# Patient Record
Sex: MALE | Race: BLACK OR AFRICAN AMERICAN
[De-identification: names, ages, dates, MRNs, and addresses within clinical notes are randomized per-mention and may not be internally consistent; named-entity substitution may affect disease eponyms.]

---

## 2020-06-01 ENCOUNTER — HOSPITAL ENCOUNTER (EMERGENCY)
Dept: HOSPITAL 26 - MED | Age: 35
Discharge: HOME | End: 2020-06-01
Payer: SELF-PAY

## 2020-06-01 VITALS — HEIGHT: 68 IN | BODY MASS INDEX: 24.25 KG/M2 | WEIGHT: 160 LBS

## 2020-06-01 VITALS — SYSTOLIC BLOOD PRESSURE: 113 MMHG | DIASTOLIC BLOOD PRESSURE: 81 MMHG

## 2020-06-01 VITALS — SYSTOLIC BLOOD PRESSURE: 119 MMHG | DIASTOLIC BLOOD PRESSURE: 66 MMHG

## 2020-06-01 DIAGNOSIS — F15.20: Primary | ICD-10-CM

## 2020-06-01 DIAGNOSIS — R45.851: ICD-10-CM

## 2020-06-01 LAB
ALBUMIN FLD-MCNC: 3.7 G/DL (ref 3.4–5)
ANION GAP SERPL CALCULATED.3IONS-SCNC: 14.2 MMOL/L (ref 8–16)
APAP SERPL-MCNC: < 0.5 UG/ML (ref 10–30)
AST SERPL-CCNC: 33 U/L (ref 15–37)
BARBITURATES UR QL SCN: NEGATIVE NG/ML
BASOPHILS # BLD AUTO: 0 K/UL (ref 0–0.22)
BASOPHILS NFR BLD AUTO: 0.2 % (ref 0–2)
BENZODIAZ UR QL SCN: NEGATIVE NG/ML
BILIRUB SERPL-MCNC: 1.3 MG/DL (ref 0–1)
BUN SERPL-MCNC: 14 MG/DL (ref 7–18)
BZE UR QL SCN: NEGATIVE NG/ML
CANNABINOIDS UR QL SCN: POSITIVE NG/ML
CHLORIDE SERPL-SCNC: 104 MMOL/L (ref 98–107)
CO2 SERPL-SCNC: 27.2 MMOL/L (ref 21–32)
CREAT SERPL-MCNC: 1 MG/DL (ref 0.6–1.3)
EOSINOPHIL # BLD AUTO: 0.2 K/UL (ref 0–0.4)
EOSINOPHIL NFR BLD AUTO: 5.2 % (ref 0–4)
ERYTHROCYTE [DISTWIDTH] IN BLOOD BY AUTOMATED COUNT: 14.4 % (ref 11.6–13.7)
GFR SERPL CREATININE-BSD FRML MDRD: 109 ML/MIN (ref 90–?)
GLUCOSE SERPL-MCNC: 98 MG/DL (ref 74–106)
HCT VFR BLD AUTO: 46.1 % (ref 36–52)
HGB BLD-MCNC: 15 G/DL (ref 12–18)
LYMPHOCYTES # BLD AUTO: 1.6 K/UL (ref 2–11.5)
LYMPHOCYTES NFR BLD AUTO: 38.4 % (ref 20.5–51.1)
MCH RBC QN AUTO: 28 PG (ref 27–31)
MCHC RBC AUTO-ENTMCNC: 32 G/DL (ref 33–37)
MCV RBC AUTO: 87.4 FL (ref 80–94)
MONOCYTES # BLD AUTO: 0.6 K/UL (ref 0.8–1)
MONOCYTES NFR BLD AUTO: 13.5 % (ref 1.7–9.3)
NEUTROPHILS # BLD AUTO: 1.8 K/UL (ref 1.8–7.7)
NEUTROPHILS NFR BLD AUTO: 42.7 % (ref 42.2–75.2)
OPIATES UR QL SCN: NEGATIVE NG/ML
PCP UR QL SCN: NEGATIVE NG/ML
PLATELET # BLD AUTO: 287 K/UL (ref 140–450)
POTASSIUM SERPL-SCNC: 4.4 MMOL/L (ref 3.5–5.1)
RBC # BLD AUTO: 5.28 MIL/UL (ref 4.2–6.1)
SALICYLATES SERPL-MCNC: < 2.8 MG/DL (ref 2.8–20)
SODIUM SERPL-SCNC: 141 MMOL/L (ref 136–145)
WBC # BLD AUTO: 4.2 K/UL (ref 4.8–10.8)

## 2020-06-01 PROCEDURE — 85025 COMPLETE CBC W/AUTO DIFF WBC: CPT

## 2020-06-01 PROCEDURE — 36415 COLL VENOUS BLD VENIPUNCTURE: CPT

## 2020-06-01 PROCEDURE — 80305 DRUG TEST PRSMV DIR OPT OBS: CPT

## 2020-06-01 PROCEDURE — G0482 DRUG TEST DEF 15-21 CLASSES: HCPCS

## 2020-06-01 PROCEDURE — 99285 EMERGENCY DEPT VISIT HI MDM: CPT

## 2020-06-01 PROCEDURE — G0480 DRUG TEST DEF 1-7 CLASSES: HCPCS

## 2020-06-01 PROCEDURE — 80053 COMPREHEN METABOLIC PANEL: CPT

## 2020-06-01 NOTE — NUR
Received packet without facesheet.  [facility] will start working on placement as 
soon as facesheet is received.

## 2020-06-01 NOTE — NUR
-------------------------------------------------------------------------------

            *** Note undone in [facility] - 06/01/20 at 2126 by MEDRICARDOJ ***             

-------------------------------------------------------------------------------

PATIENT HAS LEFT FACILITY AT THIS TIME.

## 2020-06-01 NOTE — NUR
Patient discharged with v/s stable. Written and verbal after care instructions 
about psychosis given and explained. 

Patient alert, oriented and verbalized understanding of instructions. 
Ambulatory with steady gait. All questions addressed prior to discharge. ID 
band removed. Patient advised to follow up with PMD. Rx of Seroquel given. 
Patient educated on indication of medication including possible reaction and 
side effects. Opportunity to ask questions provided and answered.

## 2020-06-01 NOTE — NUR
REPORT RECEIVED FROM NANCY RN, TRANSFER OF CARE AT THIS TIME. PT RESTING WITH 
EYES CLOSED, BREATHING EVEN AND UNLABORED

## 2020-06-01 NOTE — NUR
Patient given written and verbal discharge instructions and verbalizes 
understanding.  Given copies of tests performed during visit.  Patient is 
awake, alert and oriented.  Ambulatory with steady gait. Pt given shelter 
packet for nearby shelters. Refuses offer of shelter placement.  Given list of 
available shelters in surrounding areas. Patient given sandwitch and food bag 
for homeless. Pt clothes appropriate for weather. Belongings given back from 
security.

## 2020-06-01 NOTE — NUR
BIBA FROM [place] ORIGINALLY C/O CP THAT CHANGED INTO SI

PLACED ON 5150 HOLD BY [place] [employer] OFFICER LORRAINE

REPORTS PT IS HEARING VOICES TELLING HIM TO USE A RAZOR BLADE TO CUT HIMSELF

HASNT BEEN TAKING SEROQUEL FOR 2 WEEKS .PT AWAKE , AOX3, AFIBRILE, AMBULATORY 
WITH STEADY GAIT, SCE, CBS BLF, FLAT SOFT NABS ABDOMEN.

PMHX DEPRESSION

## 2020-06-01 NOTE — NUR
Telepscyhe report received, given to ERMROBBI. Per ERMD Patient is okay for 
discharge and will be discharging him soon.

## 2020-06-02 ENCOUNTER — HOSPITAL ENCOUNTER (EMERGENCY)
Dept: HOSPITAL 26 - MED | Age: 35
Discharge: LEFT BEFORE DISCHARGE | End: 2020-06-02
Payer: SELF-PAY

## 2020-06-02 VITALS — HEIGHT: 70 IN | BODY MASS INDEX: 20.76 KG/M2 | WEIGHT: 145 LBS

## 2020-06-02 DIAGNOSIS — Z53.21: Primary | ICD-10-CM
